# Patient Record
Sex: FEMALE | Race: WHITE | NOT HISPANIC OR LATINO | Employment: PART TIME | URBAN - METROPOLITAN AREA
[De-identification: names, ages, dates, MRNs, and addresses within clinical notes are randomized per-mention and may not be internally consistent; named-entity substitution may affect disease eponyms.]

---

## 2023-10-04 ENCOUNTER — HOSPITAL ENCOUNTER (EMERGENCY)
Facility: HOSPITAL | Age: 33
Discharge: HOME | End: 2023-10-05
Attending: EMERGENCY MEDICINE
Payer: MEDICAID

## 2023-10-04 VITALS
BODY MASS INDEX: 24.84 KG/M2 | TEMPERATURE: 98.5 F | DIASTOLIC BLOOD PRESSURE: 66 MMHG | RESPIRATION RATE: 18 BRPM | HEIGHT: 62 IN | WEIGHT: 135 LBS | HEART RATE: 71 BPM | SYSTOLIC BLOOD PRESSURE: 146 MMHG | OXYGEN SATURATION: 100 %

## 2023-10-04 DIAGNOSIS — O20.0 MISCARRIAGE, THREATENED, EARLY PREGNANCY: Primary | ICD-10-CM

## 2023-10-04 LAB
BASOPHILS # BLD AUTO: 0.07 10*3/MM3 (ref 0–0.2)
BASOPHILS NFR BLD AUTO: 0.7 % (ref 0–1.5)
DEPRECATED RDW RBC AUTO: 40.3 FL (ref 37–54)
EOSINOPHIL # BLD AUTO: 0.46 10*3/MM3 (ref 0–0.4)
EOSINOPHIL NFR BLD AUTO: 4.5 % (ref 0.3–6.2)
ERYTHROCYTE [DISTWIDTH] IN BLOOD BY AUTOMATED COUNT: 11.1 % (ref 12.3–15.4)
HCT VFR BLD AUTO: 39.1 % (ref 34–46.6)
HGB BLD-MCNC: 13.5 G/DL (ref 12–15.9)
IMM GRANULOCYTES # BLD AUTO: 0.01 10*3/MM3 (ref 0–0.05)
IMM GRANULOCYTES NFR BLD AUTO: 0.1 % (ref 0–0.5)
LYMPHOCYTES # BLD AUTO: 2.8 10*3/MM3 (ref 0.7–3.1)
LYMPHOCYTES NFR BLD AUTO: 27.1 % (ref 19.6–45.3)
MCH RBC QN AUTO: 33.6 PG (ref 26.6–33)
MCHC RBC AUTO-ENTMCNC: 34.5 G/DL (ref 31.5–35.7)
MCV RBC AUTO: 97.3 FL (ref 79–97)
MONOCYTES # BLD AUTO: 0.81 10*3/MM3 (ref 0.1–0.9)
MONOCYTES NFR BLD AUTO: 7.8 % (ref 5–12)
NEUTROPHILS NFR BLD AUTO: 59.8 % (ref 42.7–76)
NEUTROPHILS NFR BLD AUTO: 6.17 10*3/MM3 (ref 1.7–7)
PLATELET # BLD AUTO: 269 10*3/MM3 (ref 140–450)
PMV BLD AUTO: 9.6 FL (ref 6–12)
RBC # BLD AUTO: 4.02 10*6/MM3 (ref 3.77–5.28)
WBC NRBC COR # BLD: 10.32 10*3/MM3 (ref 3.4–10.8)

## 2023-10-04 PROCEDURE — 96360 HYDRATION IV INFUSION INIT: CPT

## 2023-10-04 PROCEDURE — 25810000003 SODIUM CHLORIDE 0.9 % SOLUTION: Performed by: EMERGENCY MEDICINE

## 2023-10-04 PROCEDURE — 86901 BLOOD TYPING SEROLOGIC RH(D): CPT | Performed by: EMERGENCY MEDICINE

## 2023-10-04 PROCEDURE — 99284 EMERGENCY DEPT VISIT MOD MDM: CPT

## 2023-10-04 PROCEDURE — 80053 COMPREHEN METABOLIC PANEL: CPT | Performed by: EMERGENCY MEDICINE

## 2023-10-04 PROCEDURE — 84702 CHORIONIC GONADOTROPIN TEST: CPT | Performed by: EMERGENCY MEDICINE

## 2023-10-04 PROCEDURE — 86900 BLOOD TYPING SEROLOGIC ABO: CPT | Performed by: EMERGENCY MEDICINE

## 2023-10-04 PROCEDURE — 85025 COMPLETE CBC W/AUTO DIFF WBC: CPT | Performed by: EMERGENCY MEDICINE

## 2023-10-04 RX ORDER — ACETAMINOPHEN 325 MG/1
650 TABLET ORAL ONCE
Status: COMPLETED | OUTPATIENT
Start: 2023-10-05 | End: 2023-10-04

## 2023-10-04 RX ADMIN — ACETAMINOPHEN 650 MG: 325 TABLET, FILM COATED ORAL at 23:55

## 2023-10-04 RX ADMIN — SODIUM CHLORIDE 1000 ML: 9 INJECTION, SOLUTION INTRAVENOUS at 23:39

## 2023-10-04 NOTE — Clinical Note
[facility] [place] [id number] [place] [address] [place] [address]  Phone: [phone]    Tiffanie Horan was seen and treated in our emergency department on 10/4/2023.  She may return to work on 10/06/2023.         Thank you for choosing [facility].    Licha Carter MD

## 2023-10-04 NOTE — Clinical Note
[facility] [place] [id number] [place] [address] [place] [address]  Phone: [phone]    Louis Rebolledo accompanied Tiffanie Horan to the emergency department on 10/4/2023. They may return to work on 10/06/2023.        Thank you for choosing [facility].    Licha Carter MD

## 2023-10-05 ENCOUNTER — APPOINTMENT (OUTPATIENT)
Dept: ULTRASOUND IMAGING | Facility: HOSPITAL | Age: 33
End: 2023-10-05
Payer: MEDICAID

## 2023-10-05 LAB
ABO GROUP BLD: NORMAL
ALBUMIN SERPL-MCNC: 4.3 G/DL (ref 3.5–5.2)
ALBUMIN/GLOB SERPL: 2 G/DL
ALP SERPL-CCNC: 97 U/L (ref 39–117)
ALT SERPL W P-5'-P-CCNC: 18 U/L (ref 1–33)
ANION GAP SERPL CALCULATED.3IONS-SCNC: 7.8 MMOL/L (ref 5–15)
AST SERPL-CCNC: 18 U/L (ref 1–32)
BILIRUB SERPL-MCNC: 0.2 MG/DL (ref 0–1.2)
BUN SERPL-MCNC: 8 MG/DL (ref 6–20)
BUN/CREAT SERPL: 8.8 (ref 7–25)
CALCIUM SPEC-SCNC: 9.5 MG/DL (ref 8.6–10.5)
CHLORIDE SERPL-SCNC: 104 MMOL/L (ref 98–107)
CO2 SERPL-SCNC: 28.2 MMOL/L (ref 22–29)
CREAT SERPL-MCNC: 0.91 MG/DL (ref 0.57–1)
EGFRCR SERPLBLD CKD-EPI 2021: 87.2 ML/MIN/1.73
GLOBULIN UR ELPH-MCNC: 2.2 GM/DL
GLUCOSE SERPL-MCNC: 98 MG/DL (ref 65–99)
HCG INTACT+B SERPL-ACNC: 7201 MIU/ML
NUMBER OF DOSES: NORMAL
POTASSIUM SERPL-SCNC: 3.4 MMOL/L (ref 3.5–5.2)
PROT SERPL-MCNC: 6.5 G/DL (ref 6–8.5)
RH BLD: POSITIVE
SODIUM SERPL-SCNC: 140 MMOL/L (ref 136–145)

## 2023-10-05 PROCEDURE — 76817 TRANSVAGINAL US OBSTETRIC: CPT

## 2023-10-05 NOTE — DISCHARGE INSTRUCTIONS
Call Dr Mayorga in the Am to schedule a follow-up appointment in the next 2-3 days. Return if problems.

## 2023-10-05 NOTE — FSED PROVIDER NOTE
Subjective   History of Present Illness  32 yof  (~ 10 weeks) complains of bleeding and cramping that started today. She reports yesterday she has some mild spotting but today it was worse. The patient has had an US a few weeks ago that showed an IUP. The patient is HIV positive.     Review of Systems   Constitutional: Negative.    Gastrointestinal: Negative.    Genitourinary:  Positive for pelvic pain and vaginal bleeding.   Musculoskeletal: Negative.    Skin: Negative.    All other systems reviewed and are negative.    No past medical history on file.    Allergies   Allergen Reactions    Penicillins Other (See Comments)     unk       No past surgical history on file.    No family history on file.    Social History     Socioeconomic History    Marital status: Single           Objective   Physical Exam  Vitals reviewed. Exam conducted with a chaperone present.   Constitutional:       General: She is in acute distress.   HENT:      Head: Normocephalic and atraumatic.      Mouth/Throat:      Mouth: Mucous membranes are moist.      Pharynx: Oropharynx is clear.   Eyes:      Extraocular Movements: Extraocular movements intact.      Pupils: Pupils are equal, round, and reactive to light.   Cardiovascular:      Rate and Rhythm: Normal rate and regular rhythm.      Heart sounds: Normal heart sounds.   Pulmonary:      Effort: Pulmonary effort is normal.      Breath sounds: Normal breath sounds.   Abdominal:      Palpations: Abdomen is soft.   Genitourinary:     Vagina: Normal.      Cervix: Dilated. Cervical bleeding present.      Uterus: Normal.       Adnexa: Right adnexa normal and left adnexa normal.   Skin:     General: Skin is warm and dry.   Neurological:      Mental Status: She is alert.     Procedures           ED Course                                           Medical Decision Making  This pregnant patient presents with vaginal bleeding in the first trimester. Differential includes ectopic, IUP,  threatened/inevitable , along with completed . Patient without a history of coagulopathy or infectious symptoms. Doubt alternate acute emergent pathology. Patient is Rho + so Rho matthew is not indicated. Patient with TVUS that showed IUP with fetal demise. Pt instructed to call her OB in the AM for a follow-up appointment.     Problems Addressed:  Miscarriage, threatened, early pregnancy: complicated acute illness or injury    Amount and/or Complexity of Data Reviewed  Labs: ordered.  Radiology: ordered.    Risk  OTC drugs.        Final diagnoses:   Miscarriage, threatened, early pregnancy       ED Disposition  ED Disposition       ED Disposition   Discharge    Condition   Stable    Comment   --               Blayne Mayorga MD  [address] [place] [address] [place] [address] [phone]    Schedule an appointment as soon as possible for a visit in 2 days           Medication List      No changes were made to your prescriptions during this visit.